# Patient Record
Sex: FEMALE | Race: BLACK OR AFRICAN AMERICAN | NOT HISPANIC OR LATINO | Employment: PART TIME | URBAN - METROPOLITAN AREA
[De-identification: names, ages, dates, MRNs, and addresses within clinical notes are randomized per-mention and may not be internally consistent; named-entity substitution may affect disease eponyms.]

---

## 2018-02-15 ENCOUNTER — HOSPITAL ENCOUNTER (EMERGENCY)
Facility: HOSPITAL | Age: 28
Discharge: HOME | End: 2018-02-15
Attending: EMERGENCY MEDICINE
Payer: MEDICAID

## 2018-02-15 VITALS
HEART RATE: 76 BPM | RESPIRATION RATE: 18 BRPM | HEIGHT: 63 IN | DIASTOLIC BLOOD PRESSURE: 69 MMHG | SYSTOLIC BLOOD PRESSURE: 122 MMHG | TEMPERATURE: 98 F | OXYGEN SATURATION: 99 % | WEIGHT: 135 LBS | BODY MASS INDEX: 23.92 KG/M2

## 2018-02-15 DIAGNOSIS — K43.9 ABDOMINAL WALL HERNIA: Primary | ICD-10-CM

## 2018-02-15 DIAGNOSIS — R93.89 ABNORMAL CT SCAN: ICD-10-CM

## 2018-02-15 DIAGNOSIS — N83.201 CYST OF RIGHT OVARY: ICD-10-CM

## 2018-02-15 DIAGNOSIS — R10.9 ABDOMINAL PAIN, UNSPECIFIED ABDOMINAL LOCATION: ICD-10-CM

## 2018-02-15 LAB
ALBUMIN SERPL BCP-MCNC: 4.2 G/DL
ALP SERPL-CCNC: 96 U/L
ALT SERPL W/O P-5'-P-CCNC: 9 U/L
ANION GAP SERPL CALC-SCNC: 9 MMOL/L
AST SERPL-CCNC: 16 U/L
B-HCG UR QL: NEGATIVE
BACTERIA #/AREA URNS HPF: ABNORMAL /HPF
BASOPHILS # BLD AUTO: 0.02 K/UL
BASOPHILS NFR BLD: 0.3 %
BILIRUB SERPL-MCNC: 0.3 MG/DL
BILIRUB UR QL STRIP: NEGATIVE
BUN SERPL-MCNC: 8 MG/DL
CALCIUM SERPL-MCNC: 9.9 MG/DL
CHLORIDE SERPL-SCNC: 103 MMOL/L
CLARITY UR: CLEAR
CO2 SERPL-SCNC: 23 MMOL/L
COLOR UR: YELLOW
CREAT SERPL-MCNC: 0.7 MG/DL
CTP QC/QA: YES
DIFFERENTIAL METHOD: NORMAL
EOSINOPHIL # BLD AUTO: 0.1 K/UL
EOSINOPHIL NFR BLD: 0.8 %
ERYTHROCYTE [DISTWIDTH] IN BLOOD BY AUTOMATED COUNT: 12.3 %
EST. GFR  (AFRICAN AMERICAN): >60 ML/MIN/1.73 M^2
EST. GFR  (NON AFRICAN AMERICAN): >60 ML/MIN/1.73 M^2
GLUCOSE SERPL-MCNC: 92 MG/DL
GLUCOSE UR QL STRIP: NEGATIVE
HCT VFR BLD AUTO: 38.9 %
HGB BLD-MCNC: 13.1 G/DL
HGB UR QL STRIP: NEGATIVE
KETONES UR QL STRIP: ABNORMAL
LEUKOCYTE ESTERASE UR QL STRIP: ABNORMAL
LIPASE SERPL-CCNC: 36 U/L
LYMPHOCYTES # BLD AUTO: 1.7 K/UL
LYMPHOCYTES NFR BLD: 23.9 %
MCH RBC QN AUTO: 30 PG
MCHC RBC AUTO-ENTMCNC: 33.7 G/DL
MCV RBC AUTO: 89 FL
MICROSCOPIC COMMENT: ABNORMAL
MONOCYTES # BLD AUTO: 0.6 K/UL
MONOCYTES NFR BLD: 8.4 %
NEUTROPHILS # BLD AUTO: 4.8 K/UL
NEUTROPHILS NFR BLD: 66.5 %
NITRITE UR QL STRIP: NEGATIVE
PH UR STRIP: 6 [PH] (ref 5–8)
PLATELET # BLD AUTO: 250 K/UL
PMV BLD AUTO: 10.7 FL
POTASSIUM SERPL-SCNC: 3.9 MMOL/L
PROT SERPL-MCNC: 7.8 G/DL
PROT UR QL STRIP: NEGATIVE
RBC # BLD AUTO: 4.37 M/UL
RBC #/AREA URNS HPF: 2 /HPF (ref 0–4)
SODIUM SERPL-SCNC: 135 MMOL/L
SP GR UR STRIP: 1.02 (ref 1–1.03)
SQUAMOUS #/AREA URNS HPF: 5 /HPF
URN SPEC COLLECT METH UR: ABNORMAL
UROBILINOGEN UR STRIP-ACNC: NEGATIVE EU/DL
WBC # BLD AUTO: 7.15 K/UL
WBC #/AREA URNS HPF: 1 /HPF (ref 0–5)

## 2018-02-15 PROCEDURE — 25000003 PHARM REV CODE 250: Performed by: NURSE PRACTITIONER

## 2018-02-15 PROCEDURE — 83690 ASSAY OF LIPASE: CPT

## 2018-02-15 PROCEDURE — 87086 URINE CULTURE/COLONY COUNT: CPT

## 2018-02-15 PROCEDURE — 25500020 PHARM REV CODE 255: Performed by: EMERGENCY MEDICINE

## 2018-02-15 PROCEDURE — 96372 THER/PROPH/DIAG INJ SC/IM: CPT

## 2018-02-15 PROCEDURE — 81000 URINALYSIS NONAUTO W/SCOPE: CPT

## 2018-02-15 PROCEDURE — 99284 EMERGENCY DEPT VISIT MOD MDM: CPT | Mod: 25

## 2018-02-15 PROCEDURE — 96361 HYDRATE IV INFUSION ADD-ON: CPT

## 2018-02-15 PROCEDURE — 96375 TX/PRO/DX INJ NEW DRUG ADDON: CPT

## 2018-02-15 PROCEDURE — 85025 COMPLETE CBC W/AUTO DIFF WBC: CPT

## 2018-02-15 PROCEDURE — 81025 URINE PREGNANCY TEST: CPT | Performed by: PHYSICIAN ASSISTANT

## 2018-02-15 PROCEDURE — 80053 COMPREHEN METABOLIC PANEL: CPT

## 2018-02-15 PROCEDURE — 96374 THER/PROPH/DIAG INJ IV PUSH: CPT | Mod: 59

## 2018-02-15 PROCEDURE — 63600175 PHARM REV CODE 636 W HCPCS: Performed by: NURSE PRACTITIONER

## 2018-02-15 RX ORDER — DICYCLOMINE HYDROCHLORIDE 20 MG/1
20 TABLET ORAL 2 TIMES DAILY PRN
Qty: 10 TABLET | Refills: 0 | Status: SHIPPED | OUTPATIENT
Start: 2018-02-15 | End: 2018-03-17

## 2018-02-15 RX ORDER — HYDROMORPHONE HYDROCHLORIDE 2 MG/ML
0.5 INJECTION, SOLUTION INTRAMUSCULAR; INTRAVENOUS; SUBCUTANEOUS
Status: COMPLETED | OUTPATIENT
Start: 2018-02-15 | End: 2018-02-15

## 2018-02-15 RX ORDER — ONDANSETRON 2 MG/ML
4 INJECTION INTRAMUSCULAR; INTRAVENOUS
Status: COMPLETED | OUTPATIENT
Start: 2018-02-15 | End: 2018-02-15

## 2018-02-15 RX ORDER — DICYCLOMINE HYDROCHLORIDE 10 MG/ML
20 INJECTION INTRAMUSCULAR
Status: COMPLETED | OUTPATIENT
Start: 2018-02-15 | End: 2018-02-15

## 2018-02-15 RX ORDER — NAPROXEN 500 MG/1
500 TABLET ORAL 2 TIMES DAILY PRN
Qty: 10 TABLET | Refills: 0 | Status: SHIPPED | OUTPATIENT
Start: 2018-02-15 | End: 2018-02-20

## 2018-02-15 RX ORDER — DOCUSATE SODIUM 100 MG/1
100 CAPSULE, LIQUID FILLED ORAL 2 TIMES DAILY
Qty: 60 CAPSULE | Refills: 0 | Status: SHIPPED | OUTPATIENT
Start: 2018-02-15

## 2018-02-15 RX ADMIN — IOHEXOL 15 ML: 300 INJECTION, SOLUTION INTRAVENOUS at 04:02

## 2018-02-15 RX ADMIN — SODIUM CHLORIDE 1000 ML: 0.9 INJECTION, SOLUTION INTRAVENOUS at 04:02

## 2018-02-15 RX ADMIN — HYDROMORPHONE HYDROCHLORIDE 0.5 MG: 2 INJECTION, SOLUTION INTRAMUSCULAR; INTRAVENOUS; SUBCUTANEOUS at 03:02

## 2018-02-15 RX ADMIN — IOHEXOL 75 ML: 350 INJECTION, SOLUTION INTRAVENOUS at 04:02

## 2018-02-15 RX ADMIN — DICYCLOMINE HYDROCHLORIDE 20 MG: 10 INJECTION INTRAMUSCULAR at 03:02

## 2018-02-15 RX ADMIN — ONDANSETRON 4 MG: 2 INJECTION INTRAMUSCULAR; INTRAVENOUS at 03:02

## 2018-02-15 NOTE — PROVIDER PROGRESS NOTES - EMERGENCY DEPT.
"Encounter Date: 2/15/2018    ED Physician Progress Notes        Physician Note:   I evaluated the patient in triage and performed medical screening exam. Patient stable at this time and awaiting bed. Chief complaint and vital signs reviewed.    Pt has hx of abdominal hernia repair.  Presents with pain at umbilical region that started this morning at work.  Reports feeling like "when she had hernia before."  Denies nausea or vomiting.  Denies urinary symptoms.  Currently on menstrual cycle.  Stable and awaiting bed for further eval.     "

## 2018-02-15 NOTE — ED TRIAGE NOTES
"Pt presents to ED c/o abdominal pain "in my navel area and the top of my stomach, it's hard to breathe, I can't stand up straight." Started yesterday "after I ate some food." Pt reports hx of hernia.   "

## 2018-02-15 NOTE — ED PROVIDER NOTES
"Encounter Date: 2/15/2018    SCRIBE #1 NOTE: I, Nicci Sams, am scribing for, and in the presence of,  ANGELES Gonsalez. I have scribed the following portions of the note - Other sections scribed: HPI and ROS.       History     Chief Complaint   Patient presents with    Abdominal Pain     "I am having pain around my navel.  It's the same kind of pain I had with my hernia."  PMx hernia repair.     CC: Abdominal Pain    HPI: This 27 y.o female presents to the ED c/o acute, constant, 10/10 periumbilical abdominal pain that began yesterday.  Patient also reports of nausea.  Patient reports she initially assumed her symptoms were a result of eating red beans last night.  Patient reports the pain resembles previous episodes associated with her prior umbilical hernia.  Patient reports having an umbilical hernia repair 7 years ago at [facility] and reports she has not been evaluated or received imaging of her abdomen since her surgery.  Patient denies fever, chills, emesis, diarrhea, back pain, chest pain, shortness of breath, or any other associated symptoms. No alleviating factors.  No prior tx.      The history is provided by the patient. No  was used.     Review of patient's allergies indicates:  No Known Allergies  History reviewed. No pertinent past medical history.  Past Surgical History:   Procedure Laterality Date     SECTION      HERNIA REPAIR       Family History   Problem Relation Age of Onset    Heart disease Mother     No Known Problems Father      Social History   Substance Use Topics    Smoking status: Never Smoker    Smokeless tobacco: Never Used    Alcohol use Yes     Review of Systems   Constitutional: Negative for chills and fever.   HENT: Negative for ear pain and sore throat.    Eyes: Negative for pain.   Respiratory: Negative for cough and shortness of breath.    Cardiovascular: Negative for chest pain.   Gastrointestinal: Positive for abdominal " pain and vomiting. Negative for diarrhea and nausea.   Genitourinary: Negative for dysuria.   Musculoskeletal: Negative for back pain.   Skin: Negative for rash.   Neurological: Negative for headaches.       Physical Exam     Initial Vitals [02/15/18 1412]   BP Pulse Resp Temp SpO2   (!) 140/71 74 18 98.1 °F (36.7 °C) 98 %      MAP       94         Physical Exam    Nursing note and vitals reviewed.  Constitutional: She appears well-developed and well-nourished. She is not diaphoretic. She is cooperative.  Non-toxic appearance. She does not have a sickly appearance. No distress.   HENT:   Head: Normocephalic and atraumatic.   Right Ear: Tympanic membrane and external ear normal. Tympanic membrane is not erythematous.   Left Ear: Tympanic membrane and external ear normal. Tympanic membrane is not erythematous.   Nose: No rhinorrhea.   Mouth/Throat: Oropharynx is clear and moist. No trismus in the jaw.   Eyes: Conjunctivae and EOM are normal.   Neck: Full passive range of motion without pain and phonation normal. Neck supple. No tracheal deviation present.   Cardiovascular: Normal rate, regular rhythm and normal heart sounds. Exam reveals no gallop and no friction rub.    No murmur heard.  Pulses:       Radial pulses are 2+ on the right side, and 2+ on the left side.   Pulmonary/Chest: Effort normal and breath sounds normal. No stridor. No tachypnea and no bradypnea. No respiratory distress. She has no wheezes. She has no rhonchi. She has no rales. She exhibits no tenderness.   Abdominal: Soft. She exhibits no distension and no mass. Bowel sounds are decreased. There is tenderness in the epigastric area, periumbilical area, suprapubic area and left upper quadrant. There is guarding. There is no rigidity and no rebound.   Well-healed, lower midline abdominal scar.   Musculoskeletal: Normal range of motion.   Lymphadenopathy:     She has no cervical adenopathy.   Neurological: She is alert and oriented to person, place,  and time. She has normal strength. No sensory deficit. Coordination and gait normal. GCS eye subscore is 4. GCS verbal subscore is 5. GCS motor subscore is 6.   Skin: Skin is warm, dry and intact. No rash noted. No cyanosis or erythema. Nails show no clubbing.   Psychiatric: She has a normal mood and affect. Her behavior is normal. Thought content normal.         ED Course   Procedures  Labs Reviewed   URINALYSIS - Abnormal; Notable for the following:        Result Value    Ketones, UA 1+ (*)     Leukocytes, UA 1+ (*)     All other components within normal limits   COMPREHENSIVE METABOLIC PANEL - Abnormal; Notable for the following:     Sodium 135 (*)     ALT 9 (*)     All other components within normal limits   URINALYSIS MICROSCOPIC - Abnormal; Notable for the following:     Bacteria, UA Few (*)     All other components within normal limits   CULTURE, URINE   CBC W/ AUTO DIFFERENTIAL   LIPASE   POCT URINE PREGNANCY                   APC / Resident Notes:   This is an evaluation of a 27 y.o. female that presents to the Emergency Department for abdominal pain.  She's had a history of an umbilical hernia that was operatively repaired approximate 6-7 years ago.  She reports the pain that she is having today feels similar to the pain she had prior to the hernia surgery. Physical Exam shows a non-toxic, afebrile, and well appearing female.  Breath sounds clear and equal.  Heart regular rhythm.  Abdomen is soft with tenderness palpation in the periumbilical, suprapubic, epigastric, and left mid abdomen.  No rebound tenderness.  Minimal guarding.  Bowel sounds are decreased.  There is a firm palpable mass just left of the umbilicus that is tender.  Well-healed midline lower abdominal scar without evidence of infection. Vital Signs Are Reassuring. If available, previous records reviewed. RESULTS: UPT negative.  CBC with no leukocytosis or anemia.  Normal platelet count.  No bandemia.  CMP with sodium 135, ALT 9, normal  lipase.  Urinalysis with 1+ leukocytes and few bacteria and 5 squamous epithelials.  Patient has no urinary symptoms.  I will culture her urine and defer treatment until culture results.  CT of the abdomen and pelvis: Several low attenuation areas in the hepatic lobe.  Gallbladder, spleen, pancreas, and adrenal glands normal.  No abnormal findings in the kidney.  IUD in the upper uterine segment.  2.1 cm oval shaped low-attenuation structure in the right adnexa.  No left adnexal masses.  Appendix and ileum within normal limits.  Mild amount of scattered colonic material.  Diastasis of the rectus sheath containing mesenteric fat loops of bowel.  Minimal fat stranding about the intraperitoneal portion of the umbilicus.  The patient was advised of the right adnexal cyst/mass, hepatic findings, and abdominal wall hernia.    My overall impression is Abdominal wall hernia with abdominal pain, right ovarian cyst, and hypoattenuated liver lesions-abnormal CT. I considered, but at this time, do not suspect bowel obstruction, bowel perforation, appendicitis, pregnancy, ectopic pregnancy, TOA, ovarian torsion, appendicitis, incarcerated hernia, bowel ischemia.    After medication in the emergency Department: Patient reports her abdominal pain is improved.  Reassessment of the abdomen reveals a soft abdomen with decreased tenderness to palpation.  There is no hernia to reduce in the abdominal wall.  There is no right lower quadrant tenderness palpation.  D/C Meds: Colace, Bentyl, naproxen. D/C Information: Hydration, fiber.  She is advised of the importance of following with OB/GYN for the renal cyst, general surgery for the hernia, and primary care for the abnormal liver findings. The diagnosis, treatment plan, instructions for follow-up and reevaluation with OB/GYN/general surgery/PCP as well as ED return precautions were discussed and understanding was verbalized. All questions or concerns have been addressed. This case was  discussed with and the patient was evaluated by Dr. Crowell who is in agreement with my assessment and plan. INES Fuentes, JIETNDRA-C        Scribe Attestation:   Scribe #1: I performed the above scribed service and the documentation accurately describes the services I performed. I attest to the accuracy of the note.    Attending Attestation:     Physician Attestation Statement for NP/PA:   I have conducted a face to face encounter with this patient in addition to the NP/PA, due to Medical Complexity    Other NP/PA Attestation Additions:     Physical Exam: Abdomen bowel sounds present soft nontender. There is no obvious umbilical hernia and no fascial defect        Physician Attestation for Scribe:  Physician Attestation Statement for Scribe #1: I, Benjamín Salvador, NP-C, reviewed documentation, as scribed by Nicci Sams in my presence, and it is both accurate and complete.                 ED Course      Clinical Impression:   The primary encounter diagnosis was Abdominal wall hernia. Diagnoses of Abdominal pain, unspecified abdominal location, Abnormal CT scan, and Cyst of right ovary were also pertinent to this visit.    Disposition:   Disposition: Discharged  Condition: Stable                        JITENDRA Blum  02/15/18 1835       Jt Crowell MD  02/15/18 0965

## 2018-02-16 NOTE — DISCHARGE INSTRUCTIONS
Please return to the Emergency Department for any new or worsening symptoms including: worsening or changes in the pain, pain in the right lower abdomen, fever, chest pain, shortness of breath, loss of consciousness, dizziness, weakness, or any other concerns.     Please follow up:  OB/GYN as soon as possible - for your ovarian cyst.  General Surgery as soon as possible - for your hernia  Primary Care as soon as possible - for your abnormal CT of the liver    If you do not have one, you may contact the one listed on your discharge paperwork or you may also call the [facility] Appointment Desk at [phone] to schedule an appointment with one.     Please take all medication as prescribed.     Bentyl for abdominal pain/cramping.  Colace to help with abdominal pain/constipation.  Naproxen for abdominal pain. You have been prescribed Naproxen for pain. This is an Non-Steroidal Anti-Inflammatory (NSAID) Medication. Please do not take any additional NSAIDs while you are taking this medication including (Advil, Aleve, Motrin, Ibuprofen, Mobic\meloxicam, Naprosyn, etc.). Please stop taking this medication if you experience: weakness, itching, yellow skin or eyes, joint pains, vomiting blood, blood or black stools, unusual weight gain, or swelling in your arms, legs, hands, or feet.

## 2018-02-17 LAB — BACTERIA UR CULT: NORMAL

## 2018-02-19 DIAGNOSIS — N83.201 BILATERAL OVARIAN CYSTS: Primary | ICD-10-CM

## 2018-02-19 DIAGNOSIS — N83.202 BILATERAL OVARIAN CYSTS: Primary | ICD-10-CM

## 2018-02-27 ENCOUNTER — HOSPITAL ENCOUNTER (OUTPATIENT)
Dept: RADIOLOGY | Facility: HOSPITAL | Age: 28
Discharge: HOME | End: 2018-02-27
Attending: INTERNAL MEDICINE
Payer: MEDICAID

## 2018-02-27 DIAGNOSIS — N83.201 BILATERAL OVARIAN CYSTS: ICD-10-CM

## 2018-02-27 DIAGNOSIS — N83.202 BILATERAL OVARIAN CYSTS: ICD-10-CM

## 2018-02-27 PROCEDURE — 76856 US EXAM PELVIC COMPLETE: CPT | Mod: 26,,, | Performed by: RADIOLOGY

## 2018-02-27 PROCEDURE — 76830 TRANSVAGINAL US NON-OB: CPT | Mod: TC

## 2018-02-27 PROCEDURE — 76830 TRANSVAGINAL US NON-OB: CPT | Mod: 26,,, | Performed by: RADIOLOGY

## 2018-03-02 DIAGNOSIS — N83.202 BILATERAL OVARIAN CYSTS: Primary | ICD-10-CM

## 2018-03-02 DIAGNOSIS — N83.201 BILATERAL OVARIAN CYSTS: Primary | ICD-10-CM

## 2018-04-03 ENCOUNTER — OFFICE VISIT (OUTPATIENT)
Dept: SURGERY | Facility: CLINIC | Age: 28
End: 2018-04-03
Payer: MEDICAID

## 2018-04-03 ENCOUNTER — HOSPITAL ENCOUNTER (OUTPATIENT)
Dept: RADIOLOGY | Facility: HOSPITAL | Age: 28
Discharge: HOME | End: 2018-04-03
Attending: INTERNAL MEDICINE
Payer: MEDICAID

## 2018-04-03 VITALS
BODY MASS INDEX: 23.39 KG/M2 | HEIGHT: 63 IN | TEMPERATURE: 98 F | HEART RATE: 71 BPM | WEIGHT: 132 LBS | SYSTOLIC BLOOD PRESSURE: 131 MMHG | DIASTOLIC BLOOD PRESSURE: 60 MMHG

## 2018-04-03 DIAGNOSIS — R10.33 UMBILICAL PAIN: ICD-10-CM

## 2018-04-03 DIAGNOSIS — N83.201 BILATERAL OVARIAN CYSTS: ICD-10-CM

## 2018-04-03 DIAGNOSIS — N83.202 BILATERAL OVARIAN CYSTS: ICD-10-CM

## 2018-04-03 PROCEDURE — 76856 US EXAM PELVIC COMPLETE: CPT | Mod: 26,,, | Performed by: RADIOLOGY

## 2018-04-03 PROCEDURE — 99203 OFFICE O/P NEW LOW 30 MIN: CPT | Mod: S$PBB,,, | Performed by: SURGERY

## 2018-04-03 PROCEDURE — 76830 TRANSVAGINAL US NON-OB: CPT | Mod: TC

## 2018-04-03 PROCEDURE — 76830 TRANSVAGINAL US NON-OB: CPT | Mod: 26,,, | Performed by: RADIOLOGY

## 2018-04-03 PROCEDURE — 99999 PR PBB SHADOW E&M-EST. PATIENT-LVL III: CPT | Mod: PBBFAC,,, | Performed by: SURGERY

## 2018-04-03 PROCEDURE — 99213 OFFICE O/P EST LOW 20 MIN: CPT | Mod: PBBFAC,25 | Performed by: SURGERY

## 2018-04-03 RX ORDER — IBUPROFEN 800 MG/1
TABLET ORAL
Refills: 0 | COMMUNITY
Start: 2018-01-30

## 2018-04-03 RX ORDER — CYCLOBENZAPRINE HCL 10 MG
TABLET ORAL
Refills: 0 | COMMUNITY
Start: 2018-01-30

## 2018-04-03 RX ORDER — NAPROXEN 500 MG/1
TABLET ORAL
Refills: 1 | COMMUNITY
Start: 2018-03-13

## 2018-04-03 RX ORDER — POLYETHYLENE GLYCOL 3350 17 G/17G
POWDER, FOR SOLUTION ORAL
Refills: 1 | COMMUNITY
Start: 2018-03-13

## 2018-04-03 RX ORDER — METHYLPREDNISOLONE 4 MG/1
TABLET ORAL
Refills: 0 | COMMUNITY
Start: 2018-01-30

## 2018-04-03 NOTE — Clinical Note
April 3, 2018      Velvet Lazaro MD  [address] [facility] [address]           [facility] - General Surgery  [address]  [place] [address]  Phone: [phone]          Patient: Jacqueline De Souza   MR Number: [id number]   YOB: 1990   Date of Visit: 4/3/2018       Dear Dr. Velvet Lazaro:    Thank you for referring Jacqueline De Souza to me for evaluation. Attached you will find relevant portions of my assessment and plan of care.    If you have questions, please do not hesitate to call me. I look forward to following Jacqueline De Souza along with you.    Sincerely,    Sebastian Blancas MD    Enclosure  CC:  No Recipients    If you would like to receive this communication electronically, please contact [email or web address] or [phone] to request more information on [employer] Link access.    For providers and/or their staff who would like to refer a patient to [facility], please contact us through our one-stop-shop provider referral line, [facility], at [phone].    If you feel you have received this communication in error or would no longer like to receive these types of communications, please e-mail [email or web address]

## 2018-04-03 NOTE — LETTER
[facility] - General Surgery  [address]  [facility] [address]  Phone: [phone] April 3, 2018      Velvet Lazaro MD  [address] [facility] [address]    Patient: Jacqueline De Souza   MR Number: [id number]   YOB: 1990   Date of Visit: 4/3/2018     Dear Dr. Lazaro:    Thank you for referring Jacqueline De Souza to me for evaluation. Below are the relevant portions of my assessment and plan of care.    ASSESSMENT/PLAN:     Patient presents with likely abdominal wall strain.      PLAN:No treatment necessary.  Follow up in one month.  Light duty for one month.     If you have questions, please do not hesitate to call me. I look forward to following Jacqueline along with you.    Sincerely,      Sebastian Blancas MD   Section Head - General, Laparoscopic, Bariatric  Acute Care and Oncologic Surgery   - Surgical Weight Loss Program  [facility]    WSAALIYAH/hunter

## 2018-04-03 NOTE — PROGRESS NOTES
History & Physical    SUBJECTIVE:     History of Present Illness:  Patient is a 27 y.o. female presents with recurrent hernia.  She is s/p umbilical hernia repair  with mesh. She noticed it come back in February this year while at work lifting.  It gave her severe pain and went to the ER.  Since then the pain has not been as bad but she has stopped lifting as much.      Chief Complaint   Patient presents with    Consult     hernia       Review of patient's allergies indicates:  No Known Allergies    Current Outpatient Prescriptions   Medication Sig Dispense Refill    cyclobenzaprine (FLEXERIL) 10 MG tablet TK 0.5 TO 1 T PO TID  0    docusate sodium (COLACE) 100 MG capsule Take 1 capsule (100 mg total) by mouth 2 (two) times daily. 60 capsule 0    ibuprofen (ADVIL,MOTRIN) 800 MG tablet TK 1 T PO Q 8 H PRN  0    levonorgestrel (MIRENA) 20 mcg/24 hr (5 years) IUD 1 each by Intrauterine route once.      methylPREDNISolone (MEDROL DOSEPACK) 4 mg tablet TK UTD  0    naproxen (NAPROSYN) 500 MG tablet TK 1 T PO BID WF PRN P  1    polyethylene glycol (GLYCOLAX) 17 gram/dose powder DIS 17 GRAMS IN 8 OUNCES OF FLUID AND DRK PO ONCE D PRF CONSTIPATION  1     No current facility-administered medications for this visit.        History reviewed. No pertinent past medical history.  Past Surgical History:   Procedure Laterality Date     SECTION      HERNIA REPAIR       Family History   Problem Relation Age of Onset    Heart disease Mother     No Known Problems Father      Social History   Substance Use Topics    Smoking status: Never Smoker    Smokeless tobacco: Never Used    Alcohol use Yes        Review of Systems:  Review of Systems   Constitutional: Negative for fever and unexpected weight change.        Has lost a couple of pounds   Respiratory: Negative for cough and chest tightness.    Cardiovascular: Negative for chest pain.   Gastrointestinal: Positive for constipation and nausea. Negative for  "diarrhea and vomiting.   Genitourinary: Negative for difficulty urinating and dysuria.        Was told she had a uti recently   Skin: Negative for rash and wound.   Neurological: Positive for headaches. Negative for seizures.   Hematological: Does not bruise/bleed easily.       OBJECTIVE:     Vital Signs (Most Recent)  Temp: 98.3 °F (36.8 °C) (04/03/18 0948)  Pulse: 71 (04/03/18 0948)  BP: 131/60 (04/03/18 0948)  5' 3" (1.6 m)  59.9 kg (132 lb)     Physical Exam:  Physical Exam   Constitutional: She is oriented to person, place, and time. She appears well-developed and well-nourished.   Neck: Normal range of motion. Neck supple.   Cardiovascular: Normal rate, regular rhythm and normal heart sounds.    Pulmonary/Chest: Effort normal and breath sounds normal.   Abdominal: Soft. Bowel sounds are normal. There is no tenderness.       Neurological: She is alert and oriented to person, place, and time.   Skin: Skin is warm and dry.   Psychiatric: She has a normal mood and affect. Her behavior is normal. Judgment and thought content normal.   Vitals reviewed.      Laboratory  CBC: Reviewed  CMP: Reviewed    Diagnostic Results:  CT: Reviewed    ASSESSMENT/PLAN:     Likely abdominal wall strain.      PLAN:    No treatment necessary.  Follow up one month.  Light duty one month.              "

## 2018-04-19 ENCOUNTER — TELEPHONE (OUTPATIENT)
Dept: SURGERY | Facility: CLINIC | Age: 28
End: 2018-04-19

## 2018-04-19 DIAGNOSIS — R10.33 UMBILICAL PAIN: Primary | ICD-10-CM

## 2018-04-19 NOTE — TELEPHONE ENCOUNTER
----- Message from Benito Rothman sent at 4/19/2018  8:26 AM CDT -----  Pt has questions regarding referral and some other questions. Please call pt regarding this at [phone]

## 2018-04-19 NOTE — TELEPHONE ENCOUNTER
Returned pt phone call-states she would like a referral to physio therapy.     Will place order per Dr. Blancas and fax info to preferred place.  [facility].

## 2018-05-01 ENCOUNTER — OFFICE VISIT (OUTPATIENT)
Dept: SURGERY | Facility: CLINIC | Age: 28
End: 2018-05-01
Payer: MEDICAID

## 2018-05-01 VITALS
WEIGHT: 132.06 LBS | BODY MASS INDEX: 23.4 KG/M2 | TEMPERATURE: 98 F | SYSTOLIC BLOOD PRESSURE: 95 MMHG | HEIGHT: 63 IN | DIASTOLIC BLOOD PRESSURE: 61 MMHG

## 2018-05-01 DIAGNOSIS — R10.33 UMBILICAL PAIN: Primary | ICD-10-CM

## 2018-05-01 PROCEDURE — 3008F BODY MASS INDEX DOCD: CPT | Mod: ,,, | Performed by: SURGERY

## 2018-05-01 PROCEDURE — 99213 OFFICE O/P EST LOW 20 MIN: CPT | Mod: PBBFAC | Performed by: SURGERY

## 2018-05-01 PROCEDURE — 99213 OFFICE O/P EST LOW 20 MIN: CPT | Mod: S$PBB,,, | Performed by: SURGERY

## 2018-05-01 PROCEDURE — 99999 PR PBB SHADOW E&M-EST. PATIENT-LVL III: CPT | Mod: PBBFAC,,, | Performed by: SURGERY

## 2018-05-01 NOTE — LETTER
[facility] - General Surgery  [address]  [facility] [address]  Phone: [phone] May 1, 2018      [facility]  [address]  [facility] [address]    Patient: Jacqueline De Souza   MR Number: [id number]   YOB: 1990   Date of Visit: 5/1/2018     Dear Dr. Sosa:    Thank you for referring Jacqueline De Souza to me for evaluation. Attached you will find relevant portions of my assessment and plan of care.    Assessment:       1. Umbilical pain          Improving.  Plan:      May go back to regular duty.  If pain worsens will put her back on light duty and start PT.  RTC in one month.    If you have questions, please do not hesitate to call me. I look forward to following Jacqueline De Souza along with you.    Sincerely,      Sebastian Blancas MD  Section Head - General, Laparoscopic, Bariatric  Acute Care and Oncologic Surgery   - Surgical Weight Loss Program  [facility]    WSR/hunter    CC  Velvet Lazaro MD    Enclosure

## 2018-05-01 NOTE — PROGRESS NOTES
Subjective:       Patient ID: Jacqueline De Souza is a 27 y.o. female.    Chief Complaint: No chief complaint on file.    HPI She is s/p umbilical hernia repair 2012 with mesh.  She was here a month ago with pain but the pain has improved.  If she is very busy she notices it but otherwise she is ok.  She has been using ice and not lifting for the last month.  Review of Systems   Constitutional: Negative for fever.   Respiratory: Negative for chest tightness.    Cardiovascular: Negative for chest pain.   Gastrointestinal: Negative for nausea and vomiting.   Genitourinary: Negative for difficulty urinating.       Objective:      Physical Exam   Constitutional: She is oriented to person, place, and time. She appears well-developed and well-nourished.   Abdominal: No hernia.       Neurological: She is alert and oriented to person, place, and time.   Skin: Skin is warm and dry.   Psychiatric: She has a normal mood and affect. Her behavior is normal. Judgment and thought content normal.   Vitals reviewed.      Assessment:       1. Umbilical pain      Improving.  Plan:      May go back to regular duty.  If pain worsens will put her back on light duty and start PT.  Rtc one month.